# Patient Record
Sex: FEMALE | Race: WHITE | Employment: UNEMPLOYED | URBAN - METROPOLITAN AREA
[De-identification: names, ages, dates, MRNs, and addresses within clinical notes are randomized per-mention and may not be internally consistent; named-entity substitution may affect disease eponyms.]

---

## 2019-09-01 ENCOUNTER — HOSPITAL ENCOUNTER (EMERGENCY)
Age: 29
Discharge: HOME | End: 2019-09-01
Attending: EMERGENCY MEDICINE
Payer: COMMERCIAL

## 2019-09-01 VITALS
DIASTOLIC BLOOD PRESSURE: 72 MMHG | BODY MASS INDEX: 32.99 KG/M2 | OXYGEN SATURATION: 99 % | HEIGHT: 66 IN | HEART RATE: 88 BPM | TEMPERATURE: 98.5 F | SYSTOLIC BLOOD PRESSURE: 135 MMHG | RESPIRATION RATE: 16 BRPM | WEIGHT: 205.25 LBS

## 2019-09-01 DIAGNOSIS — L02.91 ABSCESS: Primary | ICD-10-CM

## 2019-09-01 PROCEDURE — 75810000289 HC I&D ABSCESS SIMP/COMP/MULT

## 2019-09-01 PROCEDURE — 99282 EMERGENCY DEPT VISIT SF MDM: CPT

## 2019-09-01 PROCEDURE — 74011000250 HC RX REV CODE- 250: Performed by: EMERGENCY MEDICINE

## 2019-09-01 RX ORDER — IBUPROFEN 600 MG/1
600 TABLET ORAL
COMMUNITY

## 2019-09-01 RX ORDER — LIDOCAINE HYDROCHLORIDE AND EPINEPHRINE 10; 10 MG/ML; UG/ML
1.5 INJECTION, SOLUTION INFILTRATION; PERINEURAL ONCE
Status: COMPLETED | OUTPATIENT
Start: 2019-09-01 | End: 2019-09-01

## 2019-09-01 RX ORDER — CEPHALEXIN 500 MG/1
500 CAPSULE ORAL 4 TIMES DAILY
Qty: 28 CAP | Refills: 0 | Status: SHIPPED | OUTPATIENT
Start: 2019-09-01 | End: 2019-09-08

## 2019-09-01 RX ADMIN — LIDOCAINE HYDROCHLORIDE,EPINEPHRINE BITARTRATE 15 MG: 10; .01 INJECTION, SOLUTION INFILTRATION; PERINEURAL at 10:58

## 2019-09-01 NOTE — ED TRIAGE NOTES
Pt ambulates to treatment area she states that on Friday she noticed a boil on the left inner groin area it is not draining. She is 2 weeks postpartum and is breast feeding at this time. She had a low grade fever yesterday but none today.   Pain increases with walking

## 2019-09-01 NOTE — DISCHARGE INSTRUCTIONS

## 2019-09-01 NOTE — ED PROVIDER NOTES
HPI The patient has had a 2 cm abscess in the left groin, and decrease of the inguinal ligament. There is a small area of fluctuance. She is 2 weeks postpartum and thinks that she had a mild fever yesterday. She has no other systemic symptoms. Past Medical History:   Diagnosis Date    Bronchitis     Strep throat        Past Surgical History:   Procedure Laterality Date    HX CYST REMOVAL           History reviewed. No pertinent family history. Social History     Socioeconomic History    Marital status:      Spouse name: Not on file    Number of children: Not on file    Years of education: Not on file    Highest education level: Not on file   Occupational History    Not on file   Social Needs    Financial resource strain: Not on file    Food insecurity:     Worry: Not on file     Inability: Not on file    Transportation needs:     Medical: Not on file     Non-medical: Not on file   Tobacco Use    Smoking status: Never Smoker    Smokeless tobacco: Never Used   Substance and Sexual Activity    Alcohol use: Not Currently    Drug use: Never    Sexual activity: Not on file   Lifestyle    Physical activity:     Days per week: Not on file     Minutes per session: Not on file    Stress: Not on file   Relationships    Social connections:     Talks on phone: Not on file     Gets together: Not on file     Attends Gnosticist service: Not on file     Active member of club or organization: Not on file     Attends meetings of clubs or organizations: Not on file     Relationship status: Not on file    Intimate partner violence:     Fear of current or ex partner: Not on file     Emotionally abused: Not on file     Physically abused: Not on file     Forced sexual activity: Not on file   Other Topics Concern    Not on file   Social History Narrative    Not on file         ALLERGIES: Patient has no known allergies. Review of Systems   Constitutional: Positive for fever.    Skin: Positive for color change. Vitals:    09/01/19 1032   BP: 135/72   Pulse: 88   Resp: 16   Temp: 98.5 °F (36.9 °C)   SpO2: 99%   Weight: 93.1 kg (205 lb 4 oz)   Height: 5' 6\" (1.676 m)            Physical Exam   Constitutional: She appears well-developed and well-nourished. No distress. HENT:   Head: Normocephalic. Neck: Normal range of motion. Cardiovascular: Normal rate. Pulmonary/Chest: Effort normal.   Abdominal: Soft. There is no tenderness. Musculoskeletal: Normal range of motion. Neurological: She is alert. Skin: Skin is warm and dry. Capillary refill takes less than 2 seconds. See hpi   Psychiatric: She has a normal mood and affect. Her behavior is normal.        [facility]         I&D Abcess Simple  Date/Time: 9/1/2019 11:15 AM  Performed by: Arianna Coronel MD  Authorized by: Arianna Coronel MD     Consent:     Consent obtained:  Verbal    Consent given by:  Patient  Location:     Type:  Abscess  Procedure details:     Needle aspiration: yes      Drainage:  Purulent    Drainage amount:  Scant    Wound treatment:  Wound left open    Packing materials:  None  Post-procedure details:     Patient tolerance of procedure:   Tolerated well, no immediate complications